# Patient Record
Sex: MALE | Race: WHITE | NOT HISPANIC OR LATINO
[De-identification: names, ages, dates, MRNs, and addresses within clinical notes are randomized per-mention and may not be internally consistent; named-entity substitution may affect disease eponyms.]

---

## 2018-12-06 PROBLEM — Z00.00 ENCOUNTER FOR PREVENTIVE HEALTH EXAMINATION: Status: ACTIVE | Noted: 2018-12-06

## 2019-02-19 ENCOUNTER — RECORD ABSTRACTING (OUTPATIENT)
Age: 84
End: 2019-02-19

## 2019-02-19 DIAGNOSIS — Z86.010 PERSONAL HISTORY OF COLONIC POLYPS: ICD-10-CM

## 2019-02-19 DIAGNOSIS — Z80.52 FAMILY HISTORY OF MALIGNANT NEOPLASM OF BLADDER: ICD-10-CM

## 2019-02-19 DIAGNOSIS — Z87.2 PERSONAL HISTORY OF DISEASES OF THE SKIN AND SUBCUTANEOUS TISSUE: ICD-10-CM

## 2019-02-19 DIAGNOSIS — Z87.891 PERSONAL HISTORY OF NICOTINE DEPENDENCE: ICD-10-CM

## 2019-02-19 DIAGNOSIS — D50.0 IRON DEFICIENCY ANEMIA SECONDARY TO BLOOD LOSS (CHRONIC): ICD-10-CM

## 2019-02-19 DIAGNOSIS — R10.9 UNSPECIFIED ABDOMINAL PAIN: ICD-10-CM

## 2019-02-19 DIAGNOSIS — Z86.79 PERSONAL HISTORY OF OTHER DISEASES OF THE CIRCULATORY SYSTEM: ICD-10-CM

## 2019-02-19 DIAGNOSIS — Z86.39 PERSONAL HISTORY OF OTHER ENDOCRINE, NUTRITIONAL AND METABOLIC DISEASE: ICD-10-CM

## 2019-02-19 DIAGNOSIS — R79.89 OTHER SPECIFIED ABNORMAL FINDINGS OF BLOOD CHEMISTRY: ICD-10-CM

## 2019-02-19 DIAGNOSIS — Z86.59 PERSONAL HISTORY OF OTHER MENTAL AND BEHAVIORAL DISORDERS: ICD-10-CM

## 2019-02-19 DIAGNOSIS — M81.0 AGE-RELATED OSTEOPOROSIS W/OUT CURRENT PATHOLOGICAL FRACTURE: ICD-10-CM

## 2019-02-19 DIAGNOSIS — Z72.3 LACK OF PHYSICAL EXERCISE: ICD-10-CM

## 2019-02-19 DIAGNOSIS — Z78.9 OTHER SPECIFIED HEALTH STATUS: ICD-10-CM

## 2019-02-19 DIAGNOSIS — K86.2 CYST OF PANCREAS: ICD-10-CM

## 2019-02-19 DIAGNOSIS — Z87.19 PERSONAL HISTORY OF OTHER DISEASES OF THE DIGESTIVE SYSTEM: ICD-10-CM

## 2019-02-19 RX ORDER — MOMETASONE FUROATE AND FORMOTEROL FUMARATE DIHYDRATE 100; 5 UG/1; UG/1
100-5 AEROSOL RESPIRATORY (INHALATION)
Refills: 0 | Status: ACTIVE | COMMUNITY

## 2019-02-19 RX ORDER — CARVEDILOL 6.25 MG/1
6.25 TABLET, FILM COATED ORAL
Refills: 0 | Status: ACTIVE | COMMUNITY

## 2019-02-19 RX ORDER — ATORVASTATIN CALCIUM 40 MG/1
40 TABLET, FILM COATED ORAL DAILY
Refills: 0 | Status: ACTIVE | COMMUNITY

## 2019-02-19 RX ORDER — ALLOPURINOL 300 MG/1
300 TABLET ORAL DAILY
Refills: 0 | Status: ACTIVE | COMMUNITY

## 2019-02-19 RX ORDER — ESCITALOPRAM OXALATE 10 MG/1
10 TABLET ORAL DAILY
Refills: 0 | Status: ACTIVE | COMMUNITY

## 2019-02-19 RX ORDER — CLOTRIMAZOLE 10 MG/G
1 CREAM TOPICAL
Refills: 0 | Status: ACTIVE | COMMUNITY

## 2019-02-19 RX ORDER — HALOBETASOL PROPIONATE 0.5 MG/G
0.05 CREAM TOPICAL
Refills: 0 | Status: ACTIVE | COMMUNITY

## 2019-02-19 RX ORDER — LISINOPRIL AND HYDROCHLOROTHIAZIDE TABLETS 10; 12.5 MG/1; MG/1
10-12.5 TABLET ORAL DAILY
Refills: 0 | Status: ACTIVE | COMMUNITY

## 2019-02-19 RX ORDER — DOCUSATE SODIUM 100 MG/1
100 CAPSULE, LIQUID FILLED ORAL
Refills: 0 | Status: ACTIVE | COMMUNITY

## 2019-02-19 RX ORDER — VIT C/E/ZN/COPPR/LUTEIN/ZEAXAN 250MG-90MG
CAPSULE ORAL
Refills: 0 | Status: ACTIVE | COMMUNITY

## 2019-02-19 RX ORDER — KETOCONAZOLE 20 MG/G
2 CREAM TOPICAL
Refills: 0 | Status: ACTIVE | COMMUNITY

## 2019-02-19 RX ORDER — OLMESARTAN MEDOXOMIL-HYDROCHLOROTHIAZIDE 25; 40 MG/1; MG/1
40-25 TABLET, FILM COATED ORAL DAILY
Refills: 0 | Status: ACTIVE | COMMUNITY

## 2019-02-19 RX ORDER — SIMVASTATIN 40 MG/1
40 TABLET, FILM COATED ORAL DAILY
Refills: 0 | Status: ACTIVE | COMMUNITY

## 2019-02-19 RX ORDER — AMLODIPINE BESYLATE 5 MG/1
5 TABLET ORAL DAILY
Refills: 0 | Status: ACTIVE | COMMUNITY

## 2019-02-19 RX ORDER — ACETAMINOPHEN 325 MG/1
325 TABLET, FILM COATED ORAL
Refills: 0 | Status: ACTIVE | COMMUNITY

## 2019-02-19 RX ORDER — MONTELUKAST SODIUM 10 MG/1
10 TABLET, FILM COATED ORAL
Refills: 0 | Status: ACTIVE | COMMUNITY

## 2019-02-19 RX ORDER — SERTACONAZOLE NITRATE 20 MG/G
2 CREAM TOPICAL
Refills: 0 | Status: ACTIVE | COMMUNITY

## 2019-02-19 RX ORDER — MULTIVIT-MIN/FOLIC/VIT K/LYCOP 400-300MCG
25 MCG TABLET ORAL DAILY
Refills: 0 | Status: ACTIVE | COMMUNITY

## 2019-02-19 RX ORDER — LOSARTAN POTASSIUM AND HYDROCHLOROTHIAZIDE 25; 100 MG/1; MG/1
100-25 TABLET ORAL DAILY
Refills: 0 | Status: ACTIVE | COMMUNITY

## 2019-02-19 RX ORDER — NYSTATIN 100000 [USP'U]/G
100000 CREAM TOPICAL
Refills: 0 | Status: ACTIVE | COMMUNITY

## 2019-02-19 RX ORDER — WARFARIN 4 MG/1
4 TABLET ORAL
Refills: 0 | Status: ACTIVE | COMMUNITY

## 2019-02-19 RX ORDER — FLUTICASONE PROPIONATE 50 UG/1
SPRAY, METERED NASAL
Refills: 0 | Status: ACTIVE | COMMUNITY

## 2019-02-19 RX ORDER — PANTOPRAZOLE 40 MG/1
40 TABLET, DELAYED RELEASE ORAL DAILY
Refills: 0 | Status: ACTIVE | COMMUNITY

## 2019-02-19 RX ORDER — EYELID CLEANSER COMBINATION 3
PADS, MEDICATED (EA) TOPICAL
Refills: 0 | Status: ACTIVE | COMMUNITY

## 2019-03-07 ENCOUNTER — APPOINTMENT (OUTPATIENT)
Dept: ENDOCRINOLOGY | Facility: CLINIC | Age: 84
End: 2019-03-07
Payer: MEDICARE

## 2019-03-07 VITALS
SYSTOLIC BLOOD PRESSURE: 135 MMHG | HEART RATE: 55 BPM | BODY MASS INDEX: 29.12 KG/M2 | WEIGHT: 208 LBS | DIASTOLIC BLOOD PRESSURE: 80 MMHG | HEIGHT: 71 IN

## 2019-03-07 DIAGNOSIS — Z87.898 PERSONAL HISTORY OF OTHER SPECIFIED CONDITIONS: ICD-10-CM

## 2019-03-07 DIAGNOSIS — E22.9 HYPERFUNCTION OF PITUITARY GLAND, UNSPECIFIED: ICD-10-CM

## 2019-03-07 PROCEDURE — 99213 OFFICE O/P EST LOW 20 MIN: CPT

## 2019-03-07 NOTE — HISTORY OF PRESENT ILLNESS
[FreeTextEntry1] : March 7, 2019\par \par .\par            PCP: Dr. Diego Lucia c/o [place] fax [phone]\par             Card: Dr. Alexys Conley (ASHD, atrial fib) -> \par             Dr. Walker Solano\par             Eyes: Dr. Martinez (AMD and VF for pituitary tumor)\par             GI: Dr. Rothman \par             Neurosurg: Dr. Valera\par             Kidney: Dr. Rodriguez\par             ENT: Dr. Chavez\par            .\par            CC: Multinodular thyroid 2/2018 FNAB: benign\par             Chromophobe pituitary macroadenoma/hypopituitary\par             (low free T4 and low testosterone)\par \par Has dominant thyroid nodule, benign by biopsy.    \par MRI of brain on\par 3/20/2018 noted:  "enlarging sella turcica lesion (from 15 mm height in 2014 to 19 mm height on current study)..."\par \par 12/2/2018 CT of brain (after fall):  "mass in region of pituitary fossa with erosion of the clifus.  It extends superior to the sella...measures 18.7 x 20.1 x 20 mm...advise MRI...."  \par \par Impression:  He may require neurosurgical evaluation.\par \par Plan:  Updated MRI of sella now,  continue to monitor T4, am cortisol,\par VF on April 11 by Dr. Londono and ROV here in June.     \par \par \par \par Previous notes from [employer] appended below.\par \par November 6, 2018\par            .\par            PCP: Dr. Diego Lucia c/o [place] fax [phone]\par             Card: Dr. Alexys Conley (ASHD, atrial fib) -> \par             Dr. Walker Solano\par             Eyes: Dr. Martinez (AMD and VF for pituitary tumor)\par             GI: Dr. Rothman \par             Neurosurg: Dr. Valera\par             Kidney: Dr. Rodriguez\par             ENT: Dr. Chavez\par            .\par            CC: Multinodular thyroid 2/2018 FNAB: benign\par             Chromophobe pituitary macroadenoma/hypopituitary\par             (low free T4 and low testosterone)\par            .\par            VF relatively stable ? some effect of pituitary adenoma\par            He will go for follow up.\par            .\par            Plan: Continue surveillance.\par            Follow up MRI pitutiary next year. \par            August 15, 2018\par            .\par            PCP: Dr. Diego Lucia c/o [place] fax [phone]\par             Card: Dr. Alexys Conley (ASHD, atrial fib) -> \par             Dr. Walker Solano\par             Eyes: Dr. Martinez (AMD and VF for pituitary tumor)\par             GI: Dr. Rothman \par             Neurosurg: Dr. Valera\par             Kidney: Dr. Rodriguez\par             ENT: Dr. Chavez\par            .\par            CC: Multinodular thyroid\par             Chromophobe pituitary macroadenoma/hypopituitary\par             (low free T4 and low testosterone)\par            .\par            Biopsy of enlarging L sided thyroid nodule revealed no suspicious cytology. Even so, needs continued surveillance.\par            .\par            MRI shows some upward growth of pituitary tumor with impingement on chiasm. Will ask Dr. Londono her impression based on VF.\par            ROV in a few months. \par            .\par            ==\par            .\par            .\par            February 13, 2018\par            .\par            PCP: Dr. Diego Lucia c/o Robert fax [phone]\par             Card: Dr. Alexys Conley (ASHD, atrial fib) -> \par             Dr. Walker Solano\par             Eyes: Dr. Martinez (AMD and VF for pituitary tumor)\par             GI: Dr. Rothman \par             Neurosurg: Dr. Valera\par             Kidney: Dr. Rodriguez\par             ENT: Dr. Chavez\par            .\par            CC: Multinodular thyroid\par             Chromophobe pituitary macroadenoma/hypopituitary\par             (low free T4 and low testosterone)\par            .\par            84 yo\par            Reports he is feeling well.\par            No change in voice, neck pain, difficulty swallowing or change in voice.\par            .\par            Medication list includes:\par            .\par            warfarin\par            allopurinol\par            atorvastatin \par            lisinopri-hctz\par            B12 tab\par            D3 1000 iu daily **\par            calcitriol 0.25 mcg daily **\par            omeprazoe 20 mg \par            .\par            Recent free T4 in normal range.\par            Recently also had MRI of abdomen - normal adrenal glands.\par            Recent ultrasound of thyroid at [place] on\par            2/9/18:\par            "compared to 2016 and 2017 showed enlarged right lobe at 6.9 cm and enlarged left lobe at 6.0 cm with bilateral nodules with increase in size of the left dominant nodule now at 3.4 x 2.9 x 2.2 cm, compared to 3/2016\par            .\par            Impression: Doing well. Radiology advises FNAB.\par            .\par            Plan: Rx faxed to [place] [facility] with note regarding coumadin \par            .\par            .\par            ==\par            .\par            November 14, 2017\par            .\par            PCP: Dr. Diego Lucia c/o Robert fax [phone]\par             Card: Dr. Alexys Conley (ASHD, atrial fib) -> \par             Dr. Walker Solano\par             Eyes: Dr. Martinez (AMD and VF for pituitary tumor)\par             GI: Dr. Rothman \par             Neurosurg: Dr. Valera\par             Kidney: Dr. Rodriguez\par             ENT: Dr. Chavez\par            .\par            CC: Multinodular thyroid\par             Chromophobe pituitary macroadenoma/hypopituitary\par             (low free T4 and low testosterone)\par            .\par            Has arthritis, does physical therapy for hyis neck. \par            .\par            # Pituitary tumor, low T4, low testosterone\par             recent free T4 4.2 (4.9-12.9) \par             Prolactin 31\par             Will ask him to repeat blood tests before next visit in November.\par            .\par            # Last bone density 3/2016 \par             Will ask him to repeat bone density 4/2018 at [place]\par            .\par            # Last thyroid sonogram 4/2017.\par             Will ask him to repeat thyroid sonogram 4/2018 at [place]\par            .\par            # Pituitary tumor, low T4\par             Will ask him to repeat blood tests before next visit in November.\par            .\par            .\par            ==\par            .\par            May 2, 2017\par            .\par            PCP: Dr. Diego Lucia c/o Robert fax [phone]\par             Card: Dr. Alexys Conley (ASHD, atrial fib) -> \par             Dr. Walker Solano\par             Eyes: Dr. Martinez (AMD and VF for pituitary tumor)\par             GI: Dr. Rothman \par             Neurosurg: Dr. Valera\par             Kidney: Dr. Rodriguez\par             ENT: Dr. Chavez\par            .\par            CC: Multinodular thyroid\par             . \par             osteoporosis -> testing of testosterone -> ...\par             very low testosterone (60) -> evaluation of pituitary\par             pituitary tumor (chromophobe) indents optic chiasm\par             large multinodular goiter (dom nodules biopsied in past) sono 6/13\par             low TSH (likely "central" from pit tumor rather that hyperthy\par             abnormality of glucose tolerance (A1c 6.0%)\par             (Hx kidney cancer, decreased GFR, gout, psoriasis)\par            .\par            83 yo \par            Recent lab tests ([facility]) kindly provided by Dr. Lucia include\par            T4 4.1 (previously 4.8)\par            free T4 0.83 (previously the same: 0.83)\par            25 OH vit D 39 (on 1000 iu daily)\par            cortisol 12.9\par            .\par            Recent sonogram of thyroid at [place]:\par            .\par            "The right lobe of the thyroid measures 7 x 4 x 2.7 cm diffusely heterogeneous and diffusely nodular. An upper pole nodule measures 1.1 x 0.8 x 1 cm. Mid pole lateral nodule measures 1.1 x 0.8 x 0.7 cm. An asymmetric density/nodule with poor margins is again seen at the mid to lower pole laterally not significantly changed measuring 3 x 2.7 x 2.1 cm. A lower pole medial nodule is again seen not significantly changed measuring 1.9 x 1.8 x 1.6 cm.\par            Left lobe of the thyroid measures at least 6.1 x 3.9 x 2.4 cm diffusely heterogeneous and diffusely nodular. A large mid to lower pole poorly defined nodule/tissue asymmetry containing calcifications is again seen. Exact comparisons are limited but this does not appear significantly changed measuring 3.3 x 2.5 x 2.2 cm.\par            IMPRESSION: Heterogeneous multinodular enlarged thyroid gland with reported prior benign biopsies from 2010 stable in the interval for which one-year interval follow-up ultrasound is again recommended.\par            ***Electronically Signed ***\par            -----------------------------------------------\par            Nick Segovia MD 04/25/17"\par            .\par            Plan:\par            # Last bone density 3/2016 \par             Will ask him to repeat bone density 4/2018 at [place]\par            .\par            # Last thyroid sonogram 4/2017.\par             Will ask him to repeat thyroid sonogram 4/2018 at [place]\par            .\par            # Pituitary tumor, low T4\par             Will ask him to repeat blood tests before next visit in November.\par            .\par            .\par            ==\par            .\par            October 4, 2016\par            .\par            PCP: Dr. Diego Lucia c/o [place] fax [phone]\par             Card: Dr. Alexys Conley (ASHD, atrial fib) -> \par             Dr. Walker Solano\par             Eyes: Dr. Martinez (AMD and VF for pituitary tumor)\par             GI: Dr. Rothman \par             Neurosurg: Dr. Valera\par             Kidney: Dr. Rodriguez\par             ENT: Dr. Chavez\par            .\par            CC: \par             osteoporosis -> testing of testosterone -> ...\par             very low testosterone (60) -> evaluation of pituitary\par             pituitary tumor (chromophobe) indents optic chiasm\par             large multinodular goiter (dom nodules biopsied in past) sono 6/13\par             low TSH (likely "central" from pit tumor rather that hyperthy\par             abnormality of glucose tolerance (A1c 6.0%)\par             (Hx kidney cancer, decreased GFR, gout, psoriasis, remote gout....)\par            .\par            .\par            Impression: T4, free T4 stable. \par            Pituitary tumor has been stable.\par            He feels well (outside of the psoriasis)\par            .\par            Plan: Continued surveillance\par            .\par            ==\par            .\par            April 5, 2016\par            .\par            PCP: Dr. Diego Lucia c/o [place] fax [phone]\par             Card: Dr. Alexys Conley (ASHD, atrial fib) -> \par             Dr. Walker Solano\par             Eyes: Dr. Martinez (AMD and VF for pituitary tumor)\par             GI: Dr. Rothman \par             Neurosurg: Dr. Valera\par             Kidney: Dr. Rodriguez\par             ENT: Dr. Chavez\par            .\par            CC: \par             osteoporosis -> testing of testosterone -> ...\par             very low testosterone (60) -> evaluation of pituitary\par             pituitary tumor (chromophobe) indents optic chiasm\par             large multinodular goiter (dom nodules biopsied in past) sono 6/13\par             low TSH (likely "central" from pit tumor rather that hyperthy\par             abnormality of glucose tolerance (A1c 6.0%)\par             (Hx kidney cancer, decreased GFR, gout, psoriasis, remote gout....)\par            .\par            Medications include Warfarin, Allopurinol, HCTZ, Vit D 1000/d \par            [facility] labs on 3/16/2016 include\par            T4 4.7 TSH 1.0 (central hypothy)\par            creatinine 1.46\par            prolactin 37\par            A1c 6.0\par            testosterone 70\par            25 OH vit D 37\par            cortisol, free 0.78 \par            .\par            # Pre-DM - A1c 6.0% stable\par            .\par            # Low bone density: \par             3/8/2016 Bone Density at [place] Spine T -2.1, L hip T -0.9\par             Left femoral neck T -1.8 \par             Forearm T -3.0 \par            .\par            .\par            # Goiter with "hot nodule"\par             3/8/2016: Ultrasound thyroid ([place]): R lobe 7.4 cm diffusely heterogeneous and nodular. Hypoechoic superior nodule measures 1.3 x 0.8 x 0.85 cm. A mid pole lateral nodule 1.2 x 0.9 x 0.8 cm..Mid pole dominant nodule 3.1 x 2.6 x 2.1 cm. Inferior pole nodule 2.8 x 2.4 x 1.7 cm. Th eleft lobe of thyroid 7.1 cm with dominant mass 2.9 x 3.1 x 3.9 cm...... Impression: Stability of previously biopsied thyroid masses. Recommend 1 year interval follow up" as read by Dr. Segovia. \par            .\par            # Chromophobe macroadenoma\par             February 2014 ([place]) MRI:\par            "MPRESSION: Pituitary macroadenoma indenting on the inferior aspect of the chiasm and extending along the medial aspect of the right cavernous sinus. There is no hemorrhage within the adenoma. \par            Cortical volume loss with multiple foci of small vessel infarctions in the periventricular white matter and centrum semiovale but no acute infarcts are seen on the diffusion sequence. There is no hemorrhage. 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 Thank you. -\par

## 2019-03-19 ENCOUNTER — RESULT REVIEW (OUTPATIENT)
Age: 84
End: 2019-03-19

## 2019-06-06 ENCOUNTER — APPOINTMENT (OUTPATIENT)
Dept: ENDOCRINOLOGY | Facility: CLINIC | Age: 84
End: 2019-06-06
Payer: MEDICARE

## 2019-06-06 VITALS
BODY MASS INDEX: 28.98 KG/M2 | WEIGHT: 207 LBS | HEIGHT: 71 IN | SYSTOLIC BLOOD PRESSURE: 138 MMHG | HEART RATE: 67 BPM | DIASTOLIC BLOOD PRESSURE: 78 MMHG

## 2019-06-06 DIAGNOSIS — E27.40 UNSPECIFIED ADRENOCORTICAL INSUFFICIENCY: ICD-10-CM

## 2019-06-06 DIAGNOSIS — E04.2 NONTOXIC MULTINODULAR GOITER: ICD-10-CM

## 2019-06-06 PROCEDURE — 99213 OFFICE O/P EST LOW 20 MIN: CPT

## 2019-06-06 NOTE — HISTORY OF PRESENT ILLNESS
[FreeTextEntry1] : June 6, 2019\par \par PCP: Dr. Diego Lucia c/o Robert fax [phone]\par             Card: Dr. Alexys Conley (ASHD, atrial fib) -> \par             Dr. Walker Solano\par             Eyes: Dr. Martinez (AMD and VF for pituitary tumor)\par             GI: Dr. Rothman \par             Neurosurg: Dr. Valera\par             Kidney: Dr. Rodriguez\par            ENT: Dr. 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   Even so, follow up ultrasouund one year is prudent.\par \par He does have low testosterone, likely on central basis due to the pituitary macroadenoma and the most recent lab tests from\par 2/28/19 from [facility] showw\par low T4 of 4.3, cortisol of 11.6  suggesting early central hypothyroidism.\par \par \par Went for follow up MRI of pituitary at [facility]:\par \par "Pituitary macroadenoma is again identified expanding the sella and extending into the suprasellar cistern with moderate compression and thinning of the optic chiasm. There is no significant encroachment upon the anterior cerebral arteries. There is mild bulging into the cavernous sinuses bilaterally, right greater than left, with no encasement of the carotid arteries. There is no extension inferiorly through the floor the sella into the sphenoid sinus. Approximate maximal dimensions are 1.6 cm AP x 2.0 cm transverse x 1.9 cm craniocaudal by my measurements. There has overall been no significant change in size or configuration when compared to 3/20/2018.\par \par ...\par IMPRESSION:\par \par Pituitary macroadenoma demonstrates no significant change when compared to 3/20/2018.\par \par \par ***Electronically Signed ***\par -----------------------------------------------\par Carroll CROWDER MD              03/19/19 "\par \par \par \par Last year underwent biopsy of enlarging thyroid nodule:\par \par "\par FINAL DIAGNOSIS\par  \par \par Thyroid, left mid to lower pole, fine needle aspiration and cell block:\par \par - Benign (Category 2)\par - Benign follicular nodule with cystic changes"\par \par \par Impression:  # MNG - continue surveillance by ultrasound....next probably in October\par \par #  Pituitary tumor - based on low testosterone, low free T4 and concept that pituitary "birds of a feather flock together", he probably is relatively pituitary - adrenal insufficient - - so if he requires major surgery or develops a severe illness, he would likely require steroid supplement.    I will try to more formally assess the adrenal response to ACTH in the future.   Visiual fields are being monitored by Dr. Londono.   It would also be reasonable to start a low dose of levothyroxine - e.g., 25 mcg po daily - because of the low free T4.    N.B.:   TSH will not be useful in setting of "central" hypothyroidism.\par \par I have asked him to return in October after updated labs and ultrasound thyroid.\par Thank you.   -  cell:       \par \par \par March 7, 2019\par \par .\par            PCP: Dr. Diego Lucia c/o Robert fax [phone]\par             Card: Dr. Alexys Conley (ASHD, atrial fib) -> \par             Dr. Walker Solano\par             Eyes: Dr. Martinez (AMD and VF for pituitary tumor)\par             GI: Dr. Rothman \par             Neurosurg: Dr. Valera\par             Kidney: Dr. Rodriguez\par             ENT: Dr. Chavez\par            .\par            CC: Multinodular thyroid 2/2018 FNAB: benign\par             Chromophobe pituitary macroadenoma/hypopituitary\par             (low free T4 and low testosterone)\par \par Has dominant thyroid nodule, benign by biopsy.    \par MRI of brain on\par 3/20/2018 noted:  "enlarging sella turcica lesion (from 15 mm height in 2014 to 19 mm height on current study)..."\par \par 12/2/2018 CT of brain (after fall):  "mass in region of pituitary fossa with erosion of the clifus.  It extends superior to the sella...measures 18.7 x 20.1 x 20 mm...advise MRI...."  \par \par Impression:  He may require neurosurgical evaluation.\par \par Plan:  Updated MRI of sella now,  continue to monitor T4, am cortisol,\par VF on April 11 by Dr. Londono and ROV here in June.     \par \par \par \par Previous notes from [employer] appended below.\par \par November 6, 2018\par            .\par            PCP: Dr. Diego Lucia c/o Robert fax [phone]\par             Card: Dr. Alexys Conley (ASHD, atrial fib) -> \par             Dr. Walker Solano\par             Eyes: Dr. Martinez (AMD and VF for pituitary tumor)\par             GI: Dr. Rothman \par             Neurosurg: Dr. Valera\par             Kidney: Dr. Rodriguez\par             ENT: Dr. Chavez\par            .\par            CC: Multinodular thyroid 2/2018 FNAB: benign\par             Chromophobe pituitary macroadenoma/hypopituitary\par             (low free T4 and low testosterone)\par            .\par            VF relatively stable ? some effect of pituitary adenoma\par            He will go for follow up.\par            .\par            Plan: Continue surveillance.\par            Follow up MRI pitutiary next year. \par            August 15, 2018\par            .\par            PCP: Dr. Diego Lucia c/o Robert fax [phone]\par             Card: Dr. Alexys Conley (ASHD, atrial fib) -> \par             Dr. Walker Solano\par             Eyes: Dr. Martinez (AMD and VF for pituitary tumor)\par             GI: Dr. Rothman \par             Neurosurg: Dr. Valera\par             Kidney: Dr. Rodriguez\par             ENT: Dr. Chavez\par            .\par            CC: Multinodular thyroid\par             Chromophobe pituitary macroadenoma/hypopituitary\par             (low free T4 and low testosterone)\par            .\par            Biopsy of enlarging L sided thyroid nodule revealed no suspicious cytology. Even so, needs continued surveillance.\par            .\par            MRI shows some upward growth of pituitary tumor with impingement on chiasm. Will ask Dr. Londono her impression based on VF.\par            ROV in a few months. \par            .\par            ==\par            .\par            .\par            February 13, 2018\par            .\par            PCP: Dr. Diego Lucia c/o Robert fax [phone]\par             Card: Dr. Alexys Conley (ASHD, atrial fib) -> \par             Dr. Walker Solano\par             Eyes: Dr. Martinez (AMD and VF for pituitary tumor)\par             GI: Dr. Rothman \par             Neurosurg: Dr. Valera\par             Kidney: Dr. Rodriguez\par             ENT: Dr. Chavez\par            .\par            CC: Multinodular thyroid\par             Chromophobe pituitary macroadenoma/hypopituitary\par             (low free T4 and low testosterone)\par            .\par            84 yo\par            Reports he is feeling well.\par            No change in voice, neck pain, difficulty swallowing or change in voice.\par            .\par            Medication list includes:\par            .\par            warfarin\par            allopurinol\par            atorvastatin \par            lisinopri-hctz\par            B12 tab\par            D3 1000 iu daily **\par            calcitriol 0.25 mcg daily **\par            omeprazoe 20 mg \par            .\par            Recent free T4 in normal range.\par            Recently also had MRI of abdomen - normal adrenal glands.\par            Recent ultrasound of thyroid at [facility] on\par            2/9/18:\par            "compared to 2016 and 2017 showed enlarged right lobe at 6.9 cm and enlarged left lobe at 6.0 cm with bilateral nodules with increase in size of the left dominant nodule now at 3.4 x 2.9 x 2.2 cm, compared to 3/2016\par            .\par            Impression: Doing well. Radiology advises FNAB.\par            .\par            Plan: Rx faxed to [facility] with note regarding coumadin \par            .\par            .\par            ==\par            .\par            November 14, 2017\par            .\par            PCP: Dr. Diego Lucia c/o Robert fax [phone]\par             Card: Dr. Alexys Conley (ASHD, atrial fib) -> \par             Dr. Walker Solano\par             Eyes: Dr. Martinez (AMD and VF for pituitary tumor)\par             GI: Dr. Rothman \par             Neurosurg: Dr. Valera\par             Kidney: Dr. Rodriguez\par             ENT: Dr. Chavez\par            .\par            CC: Multinodular thyroid\par             Chromophobe pituitary macroadenoma/hypopituitary\par             (low free T4 and low testosterone)\par            .\par            Has arthritis, does physical therapy for hyis neck. \par            .\par            # Pituitary tumor, low T4, low testosterone\par             recent free T4 4.2 (4.9-12.9) \par             Prolactin 31\par             Will ask him to repeat blood tests before next visit in November.\par            .\par            # Last bone density 3/2016 \par             Will ask him to repeat bone density 4/2018 at [facility]\par            .\par            # Last thyroid sonogram 4/2017.\par             Will ask him to repeat thyroid sonogram 4/2018 at [facility]\par            .\par            # Pituitary tumor, low T4\par             Will ask him to repeat blood tests before next visit in November.\par            .\par            .\par            ==\par            .\par            May 2, 2017\par            .\par            PCP: Dr. Diego Lucia c/o Robert fax [phone]\par             Card: Dr. Alexys Conley (ASHD, atrial fib) -> \par             Dr. Walker Solano\par             Eyes: Dr. Martinez (AMD and VF for pituitary tumor)\par             GI: Dr. Rothman \par             Neurosurg: Dr. Valera\par             Kidney: Dr. Rodriguez\par             ENT: Dr. Chavez\par            .\par            CC: Multinodular thyroid\par             . \par             osteoporosis -> testing of testosterone -> ...\par             very low testosterone (60) -> evaluation of pituitary\par             pituitary tumor (chromophobe) indents optic chiasm\par             large multinodular goiter (dom nodules biopsied in past) sono 6/13\par             low TSH (likely "central" from pit tumor rather that hyperthy\par             abnormality of glucose tolerance (A1c 6.0%)\par             (Hx kidney cancer, decreased GFR, gout, psoriasis)\par            .\par            81 yo \par            Recent lab tests ([facility]) kindly provided by Dr. Lucia include\par            T4 4.1 (previously 4.8)\par            free T4 0.83 (previously the same: 0.83)\par            25 OH vit D 39 (on 1000 iu daily)\par            cortisol 12.9\par            .\par            Recent sonogram of thyroid at [facility]:\par            .\par            "The right lobe of the thyroid measures 7 x 4 x 2.7 cm diffusely heterogeneous and diffusely nodular. An upper pole nodule measures 1.1 x 0.8 x 1 cm. Mid pole lateral nodule measures 1.1 x 0.8 x 0.7 cm. An asymmetric density/nodule with poor margins is again seen at the mid to lower pole laterally not significantly changed measuring 3 x 2.7 x 2.1 cm. A lower pole medial nodule is again seen not significantly changed measuring 1.9 x 1.8 x 1.6 cm.\par            Left lobe of the thyroid measures at least 6.1 x 3.9 x 2.4 cm diffusely heterogeneous and diffusely nodular. A large mid to lower pole poorly defined nodule/tissue asymmetry containing calcifications is again seen. Exact comparisons are limited but this does not appear significantly changed measuring 3.3 x 2.5 x 2.2 cm.\par            IMPRESSION: Heterogeneous multinodular enlarged thyroid gland with reported prior benign biopsies from 2010 stable in the interval for which one-year interval follow-up ultrasound is again recommended.\par            ***Electronically Signed ***\par            -----------------------------------------------\par            Nick Segovia MD 04/25/17"\par            .\par            Plan:\par            # Last bone density 3/2016 \par             Will ask him to repeat bone density 4/2018 at [facility]\par            .\par            # Last thyroid sonogram 4/2017.\par             Will ask him to repeat thyroid sonogram 4/2018 at [facility]\par            .\par            # Pituitary tumor, low T4\par             Will ask him to repeat blood tests before next visit in November.\par            .\par            .\par            ==\par            .\par            October 4, 2016\par            .\par            PCP: Dr. Diego Lucia c/o Robert fax [phone]\par             Card: Dr. Alexys Conley (ASHD, atrial fib) -> \par             Dr. Walker Solano\par             Eyes: Dr. Martinez (AMD and VF for pituitary tumor)\par             GI: Dr. Rothman \par             Neurosurg: Dr. Valera\par             Kidney: Dr. Rodriguez\par             ENT: Dr. Chavez\par            .\par            CC: \par             osteoporosis -> testing of testosterone -> ...\par             very low testosterone (60) -> evaluation of pituitary\par             pituitary tumor (chromophobe) indents optic chiasm\par             large multinodular goiter (dom nodules biopsied in past) sono 6/13\par             low TSH (likely "central" from pit tumor rather that hyperthy\par             abnormality of glucose tolerance (A1c 6.0%)\par             (Hx kidney cancer, decreased GFR, gout, psoriasis, remote gout....)\par            .\par            .\par            Impression: T4, free T4 stable. \par            Pituitary tumor has been stable.\par            He feels well (outside of the psoriasis)\par            .\par            Plan: Continued surveillance\par            .\par            ==\par            .\par            April 5, 2016\par            .\par            PCP: Dr. Diego Lucia c/o Robert fax [phone]\par             Card: Dr. Alexys Conley (ASHD, atrial fib) -> \par             Dr. Walker Solano\par             Eyes: Dr. Martinez (AMD and VF for pituitary tumor)\par             GI: Dr. Rothman \par             Neurosurg: Dr. Valera\par             Kidney: Dr. Rodriguez\par             ENT: Dr. Chavez\par            .\par            CC: \par             osteoporosis -> testing of testosterone -> ...\par             very low testosterone (60) -> evaluation of pituitary\par             pituitary tumor (chromophobe) indents optic chiasm\par             large multinodular goiter (dom nodules biopsied in past) sono 6/13\par             low TSH (likely "central" from pit tumor rather that hyperthy\par             abnormality of glucose tolerance (A1c 6.0%)\par             (Hx kidney cancer, decreased GFR, gout, psoriasis, remote gout....)\par            .\par            Medications include Warfarin, Allopurinol, HCTZ, Vit D 1000/d \par            [facility] labs on 3/16/2016 include\par            T4 4.7 TSH 1.0 (central hypothy)\par            creatinine 1.46\par            prolactin 37\par            A1c 6.0\par            testosterone 70\par            25 OH vit D 37\par            cortisol, free 0.78 \par            .\par            # Pre-DM - A1c 6.0% stable\par            .\par            # Low bone density: \par             3/8/2016 Bone Density at [facility] Spine T -2.1, L hip T -0.9\par             Left femoral neck T -1.8 \par             Forearm T -3.0 \par            .\par            .\par            # Goiter with "hot nodule"\par             3/8/2016: Ultrasound thyroid ([facility]): R lobe 7.4 cm diffusely heterogeneous and nodular. Hypoechoic superior nodule measures 1.3 x 0.8 x 0.85 cm. A mid pole lateral nodule 1.2 x 0.9 x 0.8 cm..Mid pole dominant nodule 3.1 x 2.6 x 2.1 cm. Inferior pole nodule 2.8 x 2.4 x 1.7 cm. Th eleft lobe of thyroid 7.1 cm with dominant mass 2.9 x 3.1 x 3.9 cm...... Impression: Stability of previously biopsied thyroid masses. Recommend 1 year interval follow up" as read by Dr. Segovia. \par            .\par            # Chromophobe macroadenoma\par             February 2014 ([facility]) MRI:\par            "MPRESSION: Pituitary macroadenoma indenting on the inferior aspect of the chiasm and extending along the medial aspect of the right cavernous sinus. There is no hemorrhage within the adenoma. \par            Cortical volume loss with multiple foci of small vessel infarctions in the periventricular white matter and centrum semiovale but no acute infarcts are seen on the diffusion sequence. There is no hemorrhage. 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 Thank you. -\par

## 2019-06-06 NOTE — PHYSICAL EXAM
[Alert] : alert [No Acute Distress] : no acute distress [Well Nourished] : well nourished [Well Developed] : well developed [Normal Sclera/Conjunctiva] : normal sclera/conjunctiva [EOMI] : extra ocular movement intact [No Proptosis] : no proptosis [Normal Oropharynx] : the oropharynx was normal [No Thyroid Nodules] : there were no palpable thyroid nodules [Thyroid Not Enlarged] : the thyroid was not enlarged [No Respiratory Distress] : no respiratory distress [No Accessory Muscle Use] : no accessory muscle use [Clear to Auscultation] : lungs were clear to auscultation bilaterally [Normal Rate] : heart rate was normal  [Normal S1, S2] : normal S1 and S2 [Regular Rhythm] : with a regular rhythm [No Edema] : there was no peripheral edema [Pedal Pulses Normal] : the pedal pulses are present [Normal Bowel Sounds] : normal bowel sounds [Not Tender] : non-tender [Soft] : abdomen soft [Not Distended] : not distended [Post Cervical Nodes] : posterior cervical nodes [Anterior Cervical Nodes] : anterior cervical nodes [Axillary Nodes] : axillary nodes [Normal] : normal and non tender [No Spinal Tenderness] : no spinal tenderness [Spine Straight] : spine straight [No Stigmata of Cushings Syndrome] : no stigmata of cushings syndrome [Normal Gait] : normal gait [Normal Strength/Tone] : muscle strength and tone were normal [No Rash] : no rash [Acanthosis Nigricans] : no acanthosis nigricans [No Tremors] : no tremors [Normal Reflexes] : deep tendon reflexes were 2+ and symmetric [Oriented x3] : oriented to person, place, and time

## 2019-06-11 ENCOUNTER — RESULT REVIEW (OUTPATIENT)
Age: 84
End: 2019-06-11

## 2019-06-11 ENCOUNTER — APPOINTMENT (OUTPATIENT)
Dept: UROLOGY | Facility: CLINIC | Age: 84
End: 2019-06-11
Payer: MEDICARE

## 2019-06-11 VITALS
BODY MASS INDEX: 29.4 KG/M2 | HEIGHT: 71 IN | SYSTOLIC BLOOD PRESSURE: 110 MMHG | HEART RATE: 72 BPM | WEIGHT: 210 LBS | DIASTOLIC BLOOD PRESSURE: 74 MMHG

## 2019-06-11 DIAGNOSIS — N39.41 URGE INCONTINENCE: ICD-10-CM

## 2019-06-11 DIAGNOSIS — N32.81 OVERACTIVE BLADDER: ICD-10-CM

## 2019-06-11 DIAGNOSIS — C64.9 MALIGNANT NEOPLASM OF UNSPECIFIED KIDNEY, EXCEPT RENAL PELVIS: ICD-10-CM

## 2019-06-11 LAB
BACTERIA: 0
BILIRUB UR QL STRIP: NORMAL
CASTS: 0
CLARITY UR: CLEAR
COLLECTION METHOD: NORMAL
CRYSTALS: 0
EPITHELIAL CELLS: 0
GLUCOSE UR-MCNC: NORMAL
HCG UR QL: 0.2 EU/DL
HGB UR QL STRIP.AUTO: NORMAL
KETONES UR-MCNC: NORMAL
LEUKOCYTE ESTERASE UR QL STRIP: NORMAL
MUCUS: 0
NITRITE UR QL STRIP: NORMAL
PH UR STRIP: 5
PROT UR STRIP-MCNC: NORMAL
RBC CASTS # UR COMP ASSIST: 0
SP GR UR STRIP: 1.01
WBC: 0

## 2019-06-11 PROCEDURE — 81000 URINALYSIS NONAUTO W/SCOPE: CPT

## 2019-06-11 PROCEDURE — 51798 US URINE CAPACITY MEASURE: CPT

## 2019-06-11 PROCEDURE — 99214 OFFICE O/P EST MOD 30 MIN: CPT | Mod: 25

## 2019-06-11 RX ORDER — OXYBUTYNIN CHLORIDE 5 MG/1
5 TABLET ORAL TWICE DAILY
Qty: 90 | Refills: 3 | Status: ACTIVE | COMMUNITY
Start: 2019-06-11 | End: 1900-01-01

## 2019-06-11 RX ORDER — METRONIDAZOLE 500 MG/1
5 TABLET ORAL
Refills: 0 | Status: COMPLETED | COMMUNITY
End: 2019-06-11

## 2019-06-17 NOTE — REVIEW OF SYSTEMS
[Loss Of Hearing] : hearing loss [SOB on Exertion] : shortness of breath during exertion [see HPI] : see HPI [Arthralgias] : arthralgias [Joint Stiffness] : joint stiffness [Joint Pain] : joint pain [Fever] : no fever [Chills] : no chills [Feeling Poorly] : not feeling poorly [Feeling Tired] : not feeling tired [Nosebleeds] : no nosebleeds [Palpitations] : no palpitations [Chest Pain] : no chest pain [Leg Claudication] : no intermittent leg claudication [Lower Ext Edema] : no extremity edema [Shortness Of Breath] : no shortness of breath [Cough] : no cough [Orthopnea] : no orthopnea [Wheezing] : no wheezing [PND] : no PND [Abdominal Pain] : no abdominal pain [Vomiting] : no vomiting [Constipation] : no constipation [Diarrhea] : no diarrhea [Heartburn] : no heartburn [Melena] : no melena [Suicidal] : not suicidal [Sleep Disturbances] : no sleep disturbances [Anxiety] : no anxiety [Depression] : no depression [Hot Flashes] : no hot flashes [Muscle Weakness] : no muscle weakness [Easy Bleeding] : no tendency for easy bleeding [Easy Bruising] : no tendency for easy bruising

## 2019-06-17 NOTE — CHIEF COMPLAINT
[FreeTextEntry1] : 1.  DETRUSOR HYPERREFLEXIA \par \par 2.   HISTORY OF RENAL CELL CARCINOMA (status post partial nephrectomy February 2010)

## 2019-06-17 NOTE — PHYSICAL EXAM
[Normal Appearance] : normal appearance [General Appearance - Well Nourished] : well nourished [Well Groomed] : well groomed [General Appearance - In No Acute Distress] : no acute distress [Bowel Sounds] : normal bowel sounds [Abdomen Tenderness] : non-tender [Abdomen Soft] : soft [Abdomen Mass (___ Cm)] : no abdominal mass palpated [Abdomen Hernia] : no hernia was discovered [Costovertebral Angle Tenderness] : no ~M costovertebral angle tenderness [Size (1+)] : size was 1+ [Nl Sphincter Tone] : normal sphincter tone [Nl Perineum] : perineum was normal on inspection [No Lesions] : no lesions [Circumcised] : the penis was circumcised [Normal] : normal [Testicular Atrophy On The Right] : atrophic [Testes] : normal [Vas Deferens / Spermatic Cord] : was normal [Epididymis] : was normal [Bradycardia] : bradycardic [5th Left ICS - MCL] : palpated at the 5th LICS in the midclavicular line [Normal S1] : normal S1 [Rhythm Regular] : regular [Normal S2] : normal S2 [No Murmur] : no murmurs heard [___ +] : bilateral [unfilled]U+ pretibial pitting edema [Respiration, Rhythm And Depth] : normal respiratory rhythm and effort [Exaggerated Use Of Accessory Muscles For Inspiration] : no accessory muscle use [] : no respiratory distress [Auscultation Breath Sounds / Voice Sounds] : lungs were clear to auscultation bilaterally [Chest Palpation] : palpation of the chest revealed no abnormalities [Oriented To Time, Place, And Person] : oriented to person, place, and time [Affect] : the affect was normal [Lungs Percussion] : the lungs were normal to percussion [Normal Station and Gait] : the gait and station were normal for the patient's age [Not Anxious] : not anxious [Mood] : the mood was normal [No Focal Deficits] : no focal deficits [No Palpable Adenopathy] : no palpable adenopathy [Prostate Hard Area Or Nodule Bilaterally] : had no palpable nodules [Rectal Exam - Prostate] : was not indurated [Prostate Tenderness] : was not tender [Prostate Fluctuant] : was not fluctuant [Internal Hemorrhoid] : no internal hemorrhoids [Occult Blood Positive] : exam was negative for occult blood [Rectal Tenderness] : no tenderness [Mass___cm] : no rectal masses [Adherent Prepuce] : no adherence of the prepuce [Phimosis] : no phimosis [Paraphimosis] : no paraphimosis [Discharge] : no discharge [FreeTextEntry1] : extensive psoriasis [S4] : no S4 [S3] : no S3 [Rt] : no varicose veins of the right leg [Lt] : no varicose veins of the left leg

## 2019-06-17 NOTE — LETTER BODY
[Dear  ___] : Dear  [unfilled], [Consult Closing:] : Thank you very much for allowing me to participate in the care of this patient.  If you have any questions, please do not hesitate to contact me. [Courtesy Letter:] : I had the pleasure of seeing your patient, [unfilled], in my office today. [___] : [unfilled] [Sincerely,] : Sincerely,

## 2019-06-17 NOTE — HISTORY OF PRESENT ILLNESS
[FreeTextEntry1] :  (Last seen 11/20/17)\par        .\par        Father John Rosado is an 82-year-old gentleman with chronic detrusor hyperreflexia (for which daily Ditropan (2.5 mg)  had been prescribed in the past with excellent results), who comes to the office today after the staff at [facility] noted that Father appeared to be voiding more frequently, and with increasing urge incontinence. Since his last visit Father reports that over the past year he started to wear one thick liner daily, (generally  noted to be "heavy" in the evening), and a pullup when going out. Father reports that he does "drink a lot" of water during the day but denies significant urinary frequency. Treatment with Lasix in the morning is also reported. \par \par Fr. 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 started but switched to to 2.5 mg of Ditropan b.i.d. for insurance reasons)\par        08/10/17: RENAL ULTRASONOGRAPHY; Impression: No evidence of contour deforming solid renal mass. Bilateral renal cysts. Diffuse bilateral increased renal cortical parenchymal echogenicity and bilateral cortical parenchymal thinning (left greater than right) are compatible with medical renal disease. No evidence of hydronephrosis. \par \par CURRENT UROLOGIC REVIEW OF SYSTEMS:\par \par Incontinence Assessed?.  YES\par \par Nocturia:  (+) 2-3  x/night\par Frequency: (-)  5-6  x/day    \par Dysuria: (-)\par Urgency:  (+) \par Hesitancy:  (-)\par Intermittency:  (-)\par Sensation of Incomplete Voiding :  (-)\par Double voiding :  (-)\par Stress incontinence:  (-)\par Urge incontinence:  (+)\par Use of absorbent pads:  (+)  WEARS ONE LINER A DAY ("THICK; HEAVY IN THE EVENING AFTER WATCHING TV")\par Terminal dribbling:  (-)\par Status of stream: "NORMAL", VARIES\par Venereal disease:  (-)\par Kidney stones:  (-)\par UTIs:  (-)\par Prior urologic surgery:  (+)  PARTIAL RIGHT NEPHRECTOMY FOR CANCER\par Hematuria:  (-)\par Abdominal pressure:  (-)\par Back pain:  (-)  (NON )\par Sexual Status: not active\par Gout:  (-)\par Renal problems:  (-)\par Family History of Urologic Problems:  (-)\par International Prostate Symptom Score (IPSS):  8 (Moderate 8-19)\par Satisfaction Index: 2 (mostly satisfied)

## 2019-06-17 NOTE — ASSESSMENT
[FreeTextEntry1] : Father John Rosado s an 84-year referred to office for evaluation after increasing urinary frequency urgency and urge incontinence were noted by the staff at [facility]. Currently Father is taking 2.5 mg of oxybutynin once a day at 5 PM. Father is not troubled by his reported symptoms. He has no evidence of infection. Urinalysis today is benign. Father is emptying his bladder well (as confirmed ultrasonographically today). For now he is managing his incontinence adequately with one pad a day. In the past BID oxybutynin proved to be poorly tolerated mostly due to dry mouth. A trial of this medication taken twice a day at 8 in the morning and 5 PM should be considered and a prescription for this will be written but its use should on a p.r.n. basis only with continued use if the response to treatment is as hoped (diminished urgency and incontinence).

## 2019-09-17 ENCOUNTER — APPOINTMENT (OUTPATIENT)
Dept: UROLOGY | Facility: CLINIC | Age: 84
End: 2019-09-17

## 2019-10-08 ENCOUNTER — APPOINTMENT (OUTPATIENT)
Dept: ENDOCRINOLOGY | Facility: CLINIC | Age: 84
End: 2019-10-08
Payer: MEDICARE

## 2019-10-08 VITALS
BODY MASS INDEX: 29.12 KG/M2 | HEART RATE: 57 BPM | HEIGHT: 71 IN | SYSTOLIC BLOOD PRESSURE: 130 MMHG | DIASTOLIC BLOOD PRESSURE: 78 MMHG | WEIGHT: 208 LBS

## 2019-10-08 DIAGNOSIS — E03.9 HYPOTHYROIDISM, UNSPECIFIED: ICD-10-CM

## 2019-10-08 DIAGNOSIS — D49.7 NEOPLASM OF UNSPECIFIED BEHAVIOR OF ENDOCRINE GLANDS AND OTHER PARTS OF NERVOUS SYSTEM: ICD-10-CM

## 2019-10-08 PROCEDURE — 99214 OFFICE O/P EST MOD 30 MIN: CPT

## 2019-10-08 NOTE — HISTORY OF PRESENT ILLNESS
[FreeTextEntry1] : October 8, 2019\par \par PCP: Dr. Diego Lucia c/o [facility] fax [phone]\par             Card: Dr. Alexys Conley (ASHD, atrial fib) -> \par             Dr. Walker Solano\par             Eyes: Dr. Martinez (AMD and VF for pituitary tumor)\par             GI: Dr. Rothman \par             Neurosurg: Dr. Valera\par             Kidney: Dr. Rodriguez\par            ENT: Dr. Chavez\par            .\par            CC: Multinodular thyroid 2/2018 FNAB: benign\par             Chromophobe pituitary macroadenoma/hypopituitary\par             (low free T4 and low testosterone)\par             (psoriasis)\par             (Hx open heart surgery at [facility]) \par \par Notes stuffiness - may wish to return for ENT evaluation.\par meds include\par calcitriol 0.25 and\par vit D 1000 iu daily \par \par Lab shows\par ACTH 24\par WBC 6.22\par Hct 34.3 \par glucose 96\par creat 1.58\par cortisol 13\par estradiol 4.6\par free T4 0.87 (0.89 - 1.76 \par LH 6\par prolactin 44 (<21) \par testosterone 22 \par \par Impression:  Chromophobe piuitary tumor.\par 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 \par \par \par \par June 6, 2019\par \par PCP: Dr. Diego Lucia [facility] fax [phone]\par             Card: Dr. Alexys Conley (ASHD, atrial fib) -> \par             Dr. Walker Solano\par             Eyes: Dr. Martinez (AMD and VF for pituitary tumor)\par             GI: Dr. Rothman \par             Neurosurg: Dr. Valera\par             Kidney: Dr. Rodriguez\par            ENT: Dr. 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   Even so, follow up ultrasouund one year is prudent.\par \par He does have low testosterone, likely on central basis due to the pituitary macroadenoma and the most recent lab tests from\par 2/28/19 from [facility] showw\par low T4 of 4.3, cortisol of 11.6  suggesting early central hypothyroidism.\par \par \par Went for follow up MRI of pituitary at [facility]:\par \par "Pituitary macroadenoma is again identified expanding the sella and extending into the suprasellar cistern with moderate compression and thinning of the optic chiasm. There is no significant encroachment upon the anterior cerebral arteries. There is mild bulging into the cavernous sinuses bilaterally, right greater than left, with no encasement of the carotid arteries. There is no extension inferiorly through the floor the sella into the sphenoid sinus. Approximate maximal dimensions are 1.6 cm AP x 2.0 cm transverse x 1.9 cm craniocaudal by my measurements. There has overall been no significant change in size or configuration when compared to 3/20/2018.\par \par ...\par IMPRESSION:\par \par Pituitary macroadenoma demonstrates no significant change when compared to 3/20/2018.\par \par \par ***Electronically Signed ***\par -----------------------------------------------\par Carroll CROWDER MD              03/19/19 "\par \par \par \par Last year underwent biopsy of enlarging thyroid nodule:\par \par "\par FINAL DIAGNOSIS\par  \par \par Thyroid, left mid to lower pole, fine needle aspiration and cell block:\par \par - Benign (Category 2)\par - Benign follicular nodule with cystic changes"\par \par \par Impression:  # MNG - continue surveillance by ultrasound....next probably in October\par \par #  Pituitary tumor - based on low testosterone, low free T4 and concept that pituitary "birds of a feather flock together", he probably is relatively pituitary - adrenal insufficient - - so if he requires major surgery or develops a severe illness, he would likely require steroid supplement.    I will try to more formally assess the adrenal response to ACTH in the future.   Visiual fields are being monitored by Dr. Londono.   It would also be reasonable to start a low dose of levothyroxine - e.g., 25 mcg po daily - because of the low free T4.    N.B.:   TSH will not be useful in setting of "central" hypothyroidism.\par \par I have asked him to return in October after updated labs and ultrasound thyroid.\par Thank you.   -  cell:  [phone]     \par \par \par March 7, 2019\par \par .\par            PC[facility]: [facility] [phone]\par             Card: Dr. Alexys Conley (ASHD, atrial fib) -> \par             Dr. Walker Solano\par             Eyes: Dr. Martinez (AMD and VF for pituitary tumor)\par             GI: Dr. Rothman \par             Neurosurg: Dr. Valera\par             Kidney: Dr. Rodriguez\par             ENT: Dr. Chavez\par            .\par            CC: Multinodular thyroid 2/2018 FNAB: benign\par             Chromophobe pituitary macroadenoma/hypopituitary\par             (low free T4 and low testosterone)\par \par Has dominant thyroid nodule, benign by biopsy.    \par MRI of brain on\par 3/20/2018 noted:  "enlarging sella turcica lesion (from 15 mm height in 2014 to 19 mm height on current study)..."\par \par 12/2/2018 CT of brain (after fall):  "mass in region of pituitary fossa with erosion of the clifus.  It extends superior to the sella...measures 18.7 x 20.1 x 20 mm...advise MRI...."  \par \par Impression:  He may require neurosurgical evaluation.\par \par Plan:  Updated MRI of sella now,  continue to monitor T4, am cortisol,\par VF on April 11 by Dr. Londono and ROV here in June.     \par \par \par \par Previous notes from [facility] [facility] appended below.\par \par November 6, 2018\par            .\par            PCP: Dr. Diego alvarez/o [facility] fax [phone]\par             Card: Dr. Alexys Conley (ASHD, atrial fib) -> \par             Dr. Walker Solano\par             Eyes: Dr. Martinez (AMD and VF for pituitary tumor)\par             GI: Dr. Rothman \par             Neurosurg: Dr. Valera\par             Kidney: Dr. Rodriguez\par             ENT: Dr. Chavez\par            .\par            CC: Multinodular thyroid 2/2018 FNAB: benign\par             Chromophobe pituitary macroadenoma/hypopituitary\par             (low free T4 and low testosterone)\par            .\par            VF relatively stable ? some effect of pituitary adenoma\par            He will go for follow up.\par            .\par            Plan: Continue surveillance.\par            Follow up MRI pitutiary next year. \par            August 15, 2018\par            .\par            PCP: Dr. Diego alvarez/o [facility] fax [phone]\par             Card: Dr. Alexys Conley (ASHD, atrial fib) -> \par             Dr. Walker Solano\par             Eyes: Dr. Martinez (AMD and VF for pituitary tumor)\par             GI: Dr. Rothman \par             Neurosurg: Dr. Valera\par             Kidney: Dr. Rodriguez\par             ENT: Dr. Chavez\par            .\par            CC: Multinodular thyroid\par             Chromophobe pituitary macroadenoma/hypopituitary\par             (low free T4 and low testosterone)\par            .\par            Biopsy of enlarging L sided thyroid nodule revealed no suspicious cytology. Even so, needs continued surveillance.\par            .\par            MRI shows some upward growth of pituitary tumor with impingement on chiasm. Will ask Dr. Londono her impression based on VF.\par            ROV in a few months. \par            .\par            ==\par            .\par            .\par            February 13, 2018\par            .\par            PCP: Dr. Diego alvarez/o [facility] fax [phone]\par             Card: Dr. Alexys Conley (ASHD, atrial fib) -> \par             Dr. Walker Solano\par             Eyes: Dr. Martinez (AMD and VF for pituitary tumor)\par             GI: Dr. Rothman \par             Neurosurg: Dr. Valera\par             Kidney: Dr. Rodriguez\par             ENT: Dr. Chavez\par            .\par            CC: Multinodular thyroid\par             Chromophobe pituitary macroadenoma/hypopituitary\par             (low free T4 and low testosterone)\par            .\par            82 yo\par            Reports he is feeling well.\par            No change in voice, neck pain, difficulty swallowing or change in voice.\par            .\par            Medication list includes:\par            .\par            warfarin\par            allopurinol\par            atorvastatin \par            lisinopri-hctz\par            B12 tab\par            D3 1000 iu daily **\par            calcitriol 0.25 mcg daily **\par            omeprazoe 20 mg \par            .\par            Recent free T4 in normal range.\par            Recently also had MRI of abdomen - normal adrenal glands.\par            Recent ultrasound of thyroid at [facility] on\par            2/9/18:\par            "compared to 2016 and 2017 showed enlarged right lobe at 6.9 cm and enlarged left lobe at 6.0 cm with bilateral nodules with increase in size of the left dominant nodule now at 3.4 x 2.9 x 2.2 cm, compared to 3/2016\par            .\par            Impression: Doing well. Radiology advises FNAB.\par            .\par            Plan: Rx faxed to [facility] with note regarding coumadin \par            .\par            .\par            ==\par            .\par            November 14, 2017\par            .\par            PCP: Dr. Diego Lucia [facility] fax [phone]\par             Card: Dr. Alexys Conley (ASHD, atrial fib) -> \par             Dr. Walker Solano\par             Eyes: Dr. Martinez (AMD and VF for pituitary tumor)\par             GI: Dr. Rothman \par             Neurosurg: Dr. Valera\par             Kidney: Dr. Rodriguez\par             ENT: Dr. Chavez\par            .\par            CC: Multinodular thyroid\par             Chromophobe pituitary macroadenoma/hypopituitary\par             (low free T4 and low testosterone)\par            .\par            Has arthritis, does physical therapy for hyis neck. \par            .\par            # Pituitary tumor, low T4, low testosterone\par             recent free T4 4.2 (4.9-12.9) \par             Prolactin 31\par             Will ask him to repeat blood tests before next visit in November.\par            .\par            # Last bone density 3/2016 \par             Will ask him to repeat bone density 4/2018 at [facility]\par            .\par            # Last thyroid sonogram 4/2017.\par             Will ask him to repeat thyroid sonogram 4/2018 at [facility]\par            .\par            # Pituitary tumor, low T4\par             Will ask him to repeat blood tests before next visit in November.\par            .\par            .\par            ==\par            .\par            May 2, 2017\par            .\par            PCP: Dr. Diego Lucia c/o [facility] fax [phone]\par             Card: Dr. Alexys Conley (ASHD, atrial fib) -> \par             Dr. Walker Solano\par             Eyes: Dr. Martinez (AMD and VF for pituitary tumor)\par             GI: Dr. Rothman \par             Neurosurg: Dr. Valera\par             Kidney: Dr. Rodriguez\par             ENT: Dr. Chavez\par            .\par            CC: Multinodular thyroid\par             . \par             osteoporosis -> testing of testosterone -> ...\par             very low testosterone (60) -> evaluation of pituitary\par             pituitary tumor (chromophobe) indents optic chiasm\par             large multinodular goiter (dom nodules biopsied in past) sono 6/13\par             low TSH (likely "central" from pit tumor rather that hyperthy\par             abnormality of glucose tolerance (A1c 6.0%)\par             (Hx kidney cancer, decreased GFR, gout, psoriasis)\par            .\par            81 yo \par            Recent lab tests ([facility]) kindly provided by Dr. Lucia include\par            T4 4.1 (previously 4.8)\par            free T4 0.83 (previously the same: 0.83)\par            25 OH vit D 39 (on 1000 iu daily)\par            cortisol 12.9\par            .\par            Recent sonogram of thyroid at [facility]:\par            .\par            "The right lobe of the thyroid measures 7 x 4 x 2.7 cm diffusely heterogeneous and diffusely nodular. An upper pole nodule measures 1.1 x 0.8 x 1 cm. Mid pole lateral nodule measures 1.1 x 0.8 x 0.7 cm. An asymmetric density/nodule with poor margins is again seen at the mid to lower pole laterally not significantly changed measuring 3 x 2.7 x 2.1 cm. A lower pole medial nodule is again seen not significantly changed measuring 1.9 x 1.8 x 1.6 cm.\par            Left lobe of the thyroid measures at least 6.1 x 3.9 x 2.4 cm diffusely heterogeneous and diffusely nodular. A large mid to lower pole poorly defined nodule/tissue asymmetry containing calcifications is again seen. Exact comparisons are limited but this does not appear significantly changed measuring 3.3 x 2.5 x 2.2 cm.\par            IMPRESSION: Heterogeneous multinodular enlarged thyroid gland with reported prior benign biopsies from 2010 stable in the interval for which one-year interval follow-up ultrasound is again recommended.\par            ***Electronically Signed ***\par            -----------------------------------------------\par            Nick Segovia MD 04/25/17"\par            .\par            Plan:\par            # Last bone density 3/2016 \par             Will ask him to repeat bone density 4/2018 at [facility]\par            .\par            # Last thyroid sonogram 4/2017.\par             Will ask him to repeat thyroid sonogram 4/2018 at [facility]            .\par            # Pituitary tumor, low T4\par             Will ask him to repeat blood tests before next visit in November.\par            .\par            .\par            ==\par            .\par            October 4, 2016\par            .\par            PCP: Dr. Diego Lucia [facility]/o [facility] fax [phone]\par             Card: Dr. Alexys Conley (ASHD, atrial fib) -> \par             Dr. Walker Solano\par             Eyes: Dr. Martinez (AMD and VF for pituitary tumor)\par             GI: Dr. Rothman \par             Neurosurg: Dr. Valera\par             Kidney: Dr. Rodriguez\par             ENT: Dr. Chavez\par            .\par            CC: \par             osteoporosis -> testing of testosterone -> ...\par             very low testosterone (60) -> evaluation of pituitary\par             pituitary tumor (chromophobe) indents optic chiasm\par             large multinodular goiter (dom nodules biopsied in past) sono 6/13\par             low TSH (likely "central" from pit tumor rather that hyperthy\par             abnormality of glucose tolerance (A1c 6.0%)\par             (Hx kidney cancer, decreased GFR, gout, psoriasis, remote gout....)\par            .\par            .\par            Impression: T4, free T4 stable. \par            Pituitary tumor has been stable.\par            He feels well (outside of the psoriasis)\par            .\par            Plan: Continued surveillance\par            .\par            ==\par            .\par            April 5, 2016\par            .\par            PCP: Dr. Diego Lucia [facility] fax [phone]\par             Card: Dr. Alexys Conley (ASHD, atrial fib) -> \par             Dr. Walker Solano\par             Eyes: Dr. Martinez (AMD and VF for pituitary tumor)\par             GI: Dr. Rothman \par             Neurosurg: Dr. Valera\par             Kidney: Dr. Rodriguez\par             ENT: Dr. Chavez\par            .\par            CC: \par             osteoporosis -> testing of testosterone -> ...\par             very low testosterone (60) -> evaluation of pituitary\par             pituitary tumor (chromophobe) indents optic chiasm\par             large multinodular goiter (dom nodules biopsied in past) sono 6/13\par             low TSH (likely "central" from pit tumor rather that hyperthy\par             abnormality of glucose tolerance (A1c 6.0%)\par             (Hx kidney cancer, decreased GFR, gout, psoriasis, remote gout....)\par            .\par            Medications include Warfarin, Allopurinol, HCTZ, Vit D 1000/d \par            [facility] labs on 3/16/2016 include\par            T4 4.7 TSH 1.0 (central hypothy)\par            creatinine 1.46\par            prolactin 37\par            A1c 6.0\par            testosterone 70\par            25 OH vit D 37\par            cortisol, free 0.78 \par            .\par            # Pre-DM - A1c 6.0% stable\par            .\par            # Low bone density: \par             3/8/2016 Bone Density at [facility] Spine T -2.1, L hip T -0.9\par             Left femoral neck T -1.8 \par             Forearm T -3.0 \par            .\par            .\par            # Goiter with "hot nodule"\par             3/8/2016: Ultrasound thyroid ([facility]): R lobe 7.4 cm diffusely heterogeneous and nodular. Hypoechoic superior nodule measures 1.3 x 0.8 x 0.85 cm. A mid pole lateral nodule 1.2 x 0.9 x 0.8 cm..Mid pole dominant nodule 3.1 x 2.6 x 2.1 cm. Inferior pole nodule 2.8 x 2.4 x 1.7 cm. Th eleft lobe of thyroid 7.1 cm with dominant mass 2.9 x 3.1 x 3.9 cm...... Impression: Stability of previously biopsied thyroid masses. Recommend 1 year interval follow up" as read by Dr. Segovia. \par            .\par            # Chromophobe macroadenoma\par             February 2014 ([facility]) MRI:\par            "MPRESSION: Pituitary macroadenoma indenting on the inferior aspect of the chiasm and extending along the medial aspect of the right cavernous sinus. There is no hemorrhage within the adenoma. \par            Cortical volume loss with multiple foci of small vessel infarctions in the periventricular white matter and centrum semiovale but no acute infarcts are seen on the diffusion sequence. There is no hemorrhage. 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 Thank you. -\par

## 2019-10-08 NOTE — PHYSICAL EXAM
[Alert] : alert [No Acute Distress] : no acute distress [Well Nourished] : well nourished [Well Developed] : well developed [Normal Sclera/Conjunctiva] : normal sclera/conjunctiva [EOMI] : extra ocular movement intact [No Proptosis] : no proptosis [Normal Oropharynx] : the oropharynx was normal [Thyroid Not Enlarged] : the thyroid was not enlarged [No Thyroid Nodules] : there were no palpable thyroid nodules [No Accessory Muscle Use] : no accessory muscle use [No Respiratory Distress] : no respiratory distress [Clear to Auscultation] : lungs were clear to auscultation bilaterally [Normal Rate] : heart rate was normal  [Normal S1, S2] : normal S1 and S2 [Regular Rhythm] : with a regular rhythm [Pedal Pulses Normal] : the pedal pulses are present [No Edema] : there was no peripheral edema [Normal Bowel Sounds] : normal bowel sounds [Not Tender] : non-tender [Soft] : abdomen soft [Not Distended] : not distended [Post Cervical Nodes] : posterior cervical nodes [Anterior Cervical Nodes] : anterior cervical nodes [Axillary Nodes] : axillary nodes [Normal] : normal and non tender [No Spinal Tenderness] : no spinal tenderness [Spine Straight] : spine straight [No Stigmata of Cushings Syndrome] : no stigmata of cushings syndrome [Normal Gait] : normal gait [Normal Strength/Tone] : muscle strength and tone were normal [No Rash] : no rash [Normal Reflexes] : deep tendon reflexes were 2+ and symmetric [No Tremors] : no tremors [Oriented x3] : oriented to person, place, and time [Acanthosis Nigricans] : no acanthosis nigricans

## 2019-10-08 NOTE — ASSESSMENT
[FreeTextEntry1] : ~\par Pituitary tumor\par Early hypothyroidism\par Can start levothyroxine 25 mcg daily

## 2020-06-16 ENCOUNTER — APPOINTMENT (OUTPATIENT)
Dept: ENDOCRINOLOGY | Facility: CLINIC | Age: 85
End: 2020-06-16